# Patient Record
Sex: FEMALE | Race: OTHER
[De-identification: names, ages, dates, MRNs, and addresses within clinical notes are randomized per-mention and may not be internally consistent; named-entity substitution may affect disease eponyms.]

---

## 2020-10-18 ENCOUNTER — HOSPITAL ENCOUNTER (EMERGENCY)
Dept: HOSPITAL 54 - ER | Age: 25
LOS: 1 days | Discharge: PSYCHIATRIC OR SUBSTANCE USE FACILITY | End: 2020-10-19
Payer: COMMERCIAL

## 2020-10-18 VITALS — WEIGHT: 145 LBS | HEIGHT: 70 IN | BODY MASS INDEX: 20.76 KG/M2

## 2020-10-18 DIAGNOSIS — R45.1: ICD-10-CM

## 2020-10-18 DIAGNOSIS — Z20.828: ICD-10-CM

## 2020-10-18 DIAGNOSIS — F99: Primary | ICD-10-CM

## 2020-10-18 LAB
ALBUMIN SERPL BCP-MCNC: 4.7 G/DL (ref 3.4–5)
ALP SERPL-CCNC: 58 U/L (ref 46–116)
ALT SERPL W P-5'-P-CCNC: 20 U/L (ref 12–78)
APAP SERPL-MCNC: 0 UG/ML (ref 10–30)
AST SERPL W P-5'-P-CCNC: 19 U/L (ref 15–37)
BASOPHILS # BLD AUTO: 0 /CMM (ref 0–0.2)
BASOPHILS NFR BLD AUTO: 0.3 % (ref 0–2)
BILIRUB DIRECT SERPL-MCNC: 0.3 MG/DL (ref 0–0.2)
BILIRUB SERPL-MCNC: 1.6 MG/DL (ref 0.2–1)
BUN SERPL-MCNC: 9 MG/DL (ref 7–18)
CALCIUM SERPL-MCNC: 9.6 MG/DL (ref 8.5–10.1)
CHLORIDE SERPL-SCNC: 99 MMOL/L (ref 98–107)
CO2 SERPL-SCNC: 18 MMOL/L (ref 21–32)
CREAT SERPL-MCNC: 1.4 MG/DL (ref 0.6–1.3)
EOSINOPHIL NFR BLD AUTO: 0 % (ref 0–6)
ETHANOL SERPL-MCNC: < 3 MG/DL (ref 0–0)
GLUCOSE SERPL-MCNC: 164 MG/DL (ref 74–106)
HCT VFR BLD AUTO: 41 % (ref 33–45)
HGB BLD-MCNC: 13.5 G/DL (ref 11.5–14.8)
LYMPHOCYTES NFR BLD AUTO: 1.2 /CMM (ref 0.8–4.8)
LYMPHOCYTES NFR BLD AUTO: 10.6 % (ref 20–44)
MCHC RBC AUTO-ENTMCNC: 33 G/DL (ref 31–36)
MCV RBC AUTO: 87 FL (ref 82–100)
MONOCYTES NFR BLD AUTO: 1.1 /CMM (ref 0.1–1.3)
MONOCYTES NFR BLD AUTO: 9.7 % (ref 2–12)
NEUTROPHILS # BLD AUTO: 8.9 /CMM (ref 1.8–8.9)
NEUTROPHILS NFR BLD AUTO: 79.4 % (ref 43–81)
PLATELET # BLD AUTO: 288 /CMM (ref 150–450)
POTASSIUM SERPL-SCNC: 3.2 MMOL/L (ref 3.5–5.1)
PROT SERPL-MCNC: 8.1 G/DL (ref 6.4–8.2)
RBC # BLD AUTO: 4.74 MIL/UL (ref 4–5.2)
SODIUM SERPL-SCNC: 139 MMOL/L (ref 136–145)
WBC NRBC COR # BLD AUTO: 11.2 K/UL (ref 4.3–11)

## 2020-10-18 PROCEDURE — C9803 HOPD COVID-19 SPEC COLLECT: HCPCS

## 2020-10-18 PROCEDURE — 81001 URINALYSIS AUTO W/SCOPE: CPT

## 2020-10-18 PROCEDURE — 80048 BASIC METABOLIC PNL TOTAL CA: CPT

## 2020-10-18 PROCEDURE — 96372 THER/PROPH/DIAG INJ SC/IM: CPT

## 2020-10-18 PROCEDURE — G0480 DRUG TEST DEF 1-7 CLASSES: HCPCS

## 2020-10-18 PROCEDURE — 80320 DRUG SCREEN QUANTALCOHOLS: CPT

## 2020-10-18 PROCEDURE — 99285 EMERGENCY DEPT VISIT HI MDM: CPT

## 2020-10-18 PROCEDURE — 80299 QUANTITATIVE ASSAY DRUG: CPT

## 2020-10-18 PROCEDURE — 80307 DRUG TEST PRSMV CHEM ANLYZR: CPT

## 2020-10-18 PROCEDURE — 80076 HEPATIC FUNCTION PANEL: CPT

## 2020-10-18 PROCEDURE — 85025 COMPLETE CBC W/AUTO DIFF WBC: CPT

## 2020-10-18 PROCEDURE — 87426 SARSCOV CORONAVIRUS AG IA: CPT

## 2020-10-18 PROCEDURE — 84703 CHORIONIC GONADOTROPIN ASSAY: CPT

## 2020-10-18 PROCEDURE — 36415 COLL VENOUS BLD VENIPUNCTURE: CPT

## 2020-10-18 NOTE — NUR
-------------------------------------------------------------------------------

             *** Note undone in ED - 10/18/20 at 2149 by MARITZA ***             

-------------------------------------------------------------------------------

PICKED UP BY [facility].
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
## 2020-10-19 VITALS — SYSTOLIC BLOOD PRESSURE: 103 MMHG | DIASTOLIC BLOOD PRESSURE: 69 MMHG

## 2020-10-19 LAB
APPEARANCE UR: (no result)
BILIRUB UR QL STRIP: (no result)
COLOR UR: YELLOW
GLUCOSE UR STRIP-MCNC: NEGATIVE MG/DL
HGB UR QL STRIP: (no result) ERY/UL
KETONES UR STRIP-MCNC: >=80 MG/DL
LEUKOCYTE ESTERASE UR QL STRIP: (no result)
NITRITE UR QL STRIP: NEGATIVE
PH UR STRIP: 6.5 [PH] (ref 5–8)
PROT UR QL STRIP: 100 MG/DL
RBC #/AREA URNS HPF: (no result) /HPF (ref 0–2)
UROBILINOGEN UR STRIP-MCNC: 0.2 EU/DL
WBC #/AREA URNS HPF: (no result) /HPF (ref 0–3)

## 2020-10-19 NOTE — NUR
PT ACCEPTED AT [facility]

PT SAMSON GO TO ROOM 145-B

ACCEPTING MD MONTEIRO

PHONE NUMBER FOR REPORT JAZZ GOMEZ [phone]
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
## 2020-10-19 NOTE — NUR
PER CARLOS JACOB INTAKE, ARRANGING FOR PATIENT TO BE TRANSFERRED TO [facility], 
AWAITING FOR CALL BACK FROM [facility] AFTER 1930 DUE TO HOSPITAL SHORT STAFFED

## 2020-10-19 NOTE — NUR
PATIENT ASLEEP, EASILY AROUSABLE BY VOICE. HOOKED TO MONITOR, WILL CONTINUE TO 
MONITOR ACCORDINGLY. KEPT SAFE, WARM AND COMFORTABLE.